# Patient Record
Sex: FEMALE | Race: WHITE | NOT HISPANIC OR LATINO | Employment: UNEMPLOYED | URBAN - METROPOLITAN AREA
[De-identification: names, ages, dates, MRNs, and addresses within clinical notes are randomized per-mention and may not be internally consistent; named-entity substitution may affect disease eponyms.]

---
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
## 2020-06-29 ENCOUNTER — OFFICE VISIT (OUTPATIENT)
Dept: OBGYN CLINIC | Facility: CLINIC | Age: 20
End: 2020-06-29
Payer: COMMERCIAL

## 2020-06-29 VITALS
HEIGHT: 61 IN | WEIGHT: 137.8 LBS | SYSTOLIC BLOOD PRESSURE: 114 MMHG | DIASTOLIC BLOOD PRESSURE: 68 MMHG | BODY MASS INDEX: 26.01 KG/M2

## 2020-06-29 DIAGNOSIS — N94.6 DYSMENORRHEA IN ADOLESCENT: Primary | ICD-10-CM

## 2020-06-29 PROCEDURE — 99202 OFFICE O/P NEW SF 15 MIN: CPT | Performed by: OBSTETRICS & GYNECOLOGY

## 2020-11-05 ENCOUNTER — NURSE TRIAGE (OUTPATIENT)
Dept: OTHER | Facility: OTHER | Age: 20
End: 2020-11-05

## 2020-11-05 DIAGNOSIS — Z20.828 SARS-ASSOCIATED CORONAVIRUS EXPOSURE: Primary | ICD-10-CM

## 2020-11-06 DIAGNOSIS — Z20.828 SARS-ASSOCIATED CORONAVIRUS EXPOSURE: ICD-10-CM

## 2020-11-06 PROCEDURE — U0003 INFECTIOUS AGENT DETECTION BY NUCLEIC ACID (DNA OR RNA); SEVERE ACUTE RESPIRATORY SYNDROME CORONAVIRUS 2 (SARS-COV-2) (CORONAVIRUS DISEASE [COVID-19]), AMPLIFIED PROBE TECHNIQUE, MAKING USE OF HIGH THROUGHPUT TECHNOLOGIES AS DESCRIBED BY CMS-2020-01-R: HCPCS | Performed by: FAMILY MEDICINE

## 2020-11-07 LAB — SARS-COV-2 RNA SPEC QL NAA+PROBE: NOT DETECTED

## 2020-11-28 ENCOUNTER — OFFICE VISIT (OUTPATIENT)
Dept: URGENT CARE | Facility: CLINIC | Age: 20
End: 2020-11-28
Payer: COMMERCIAL

## 2020-11-28 VITALS
RESPIRATION RATE: 16 BRPM | HEART RATE: 80 BPM | WEIGHT: 135 LBS | DIASTOLIC BLOOD PRESSURE: 78 MMHG | TEMPERATURE: 97.8 F | SYSTOLIC BLOOD PRESSURE: 115 MMHG | HEIGHT: 61 IN | OXYGEN SATURATION: 100 % | BODY MASS INDEX: 25.49 KG/M2

## 2020-11-28 DIAGNOSIS — R35.0 URINE FREQUENCY: Primary | ICD-10-CM

## 2020-11-28 LAB
SL AMB  POCT GLUCOSE, UA: NEGATIVE
SL AMB LEUKOCYTE ESTERASE,UA: NORMAL
SL AMB POCT BILIRUBIN,UA: NEGATIVE
SL AMB POCT BLOOD,UA: NEGATIVE
SL AMB POCT CLARITY,UA: CLEAR
SL AMB POCT COLOR,UA: NORMAL
SL AMB POCT KETONES,UA: NEGATIVE
SL AMB POCT NITRITE,UA: NEGATIVE
SL AMB POCT PH,UA: 5
SL AMB POCT SPECIFIC GRAVITY,UA: 1.01
SL AMB POCT URINE HCG: NEGATIVE
SL AMB POCT URINE PROTEIN: NEGATIVE
SL AMB POCT UROBILINOGEN: 0.2

## 2020-11-28 PROCEDURE — 99213 OFFICE O/P EST LOW 20 MIN: CPT | Performed by: NURSE PRACTITIONER

## 2020-11-28 PROCEDURE — 81002 URINALYSIS NONAUTO W/O SCOPE: CPT | Performed by: NURSE PRACTITIONER

## 2020-11-28 PROCEDURE — 81025 URINE PREGNANCY TEST: CPT | Performed by: NURSE PRACTITIONER

## 2020-11-28 PROCEDURE — 87086 URINE CULTURE/COLONY COUNT: CPT | Performed by: NURSE PRACTITIONER

## 2020-11-29 LAB — BACTERIA UR CULT: NORMAL

## 2022-10-31 ENCOUNTER — PREPPED CHART (OUTPATIENT)
Dept: URBAN - METROPOLITAN AREA CLINIC 6 | Facility: CLINIC | Age: 22
End: 2022-10-31

## 2022-11-30 ENCOUNTER — NEW PATIENT (OUTPATIENT)
Dept: URBAN - METROPOLITAN AREA CLINIC 6 | Facility: CLINIC | Age: 22
End: 2022-11-30

## 2022-11-30 DIAGNOSIS — H50.32: ICD-10-CM

## 2022-11-30 DIAGNOSIS — Z01.00: ICD-10-CM

## 2022-11-30 PROCEDURE — 92015 DETERMINE REFRACTIVE STATE: CPT

## 2022-11-30 PROCEDURE — 92004 COMPRE OPH EXAM NEW PT 1/>: CPT

## 2022-11-30 ASSESSMENT — KERATOMETRY
OD_AXISANGLE_DEGREES: 15
OD_K2POWER_DIOPTERS: 45.75
OS_K1POWER_DIOPTERS: 43.75
OS_K2POWER_DIOPTERS: 45.50
OS_AXISANGLE2_DEGREES: 83
OD_K1POWER_DIOPTERS: 42.75
OD_AXISANGLE2_DEGREES: 105
OS_AXISANGLE_DEGREES: 173

## 2022-11-30 ASSESSMENT — VISUAL ACUITY
OD_CC: 20/30-1
OS_CC: 20/30-1

## 2022-11-30 ASSESSMENT — TONOMETRY
OS_IOP_MMHG: 14
OD_IOP_MMHG: 17

## 2024-10-25 ENCOUNTER — ESTABLISHED COMPREHENSIVE EXAM (OUTPATIENT)
Dept: URBAN - METROPOLITAN AREA CLINIC 6 | Facility: CLINIC | Age: 24
End: 2024-10-25

## 2024-10-25 DIAGNOSIS — H50.32: ICD-10-CM

## 2024-10-25 PROCEDURE — 92014 COMPRE OPH EXAM EST PT 1/>: CPT

## 2024-10-25 PROCEDURE — 92015 DETERMINE REFRACTIVE STATE: CPT

## 2024-10-25 ASSESSMENT — TONOMETRY
OS_IOP_MMHG: 14
OD_IOP_MMHG: 12

## 2024-10-25 ASSESSMENT — KERATOMETRY
OD_K1POWER_DIOPTERS: 42.75
OD_AXISANGLE2_DEGREES: 105
OD_K2POWER_DIOPTERS: 45.75
OS_AXISANGLE2_DEGREES: 83
OS_K1POWER_DIOPTERS: 43.75
OS_AXISANGLE_DEGREES: 173
OD_AXISANGLE_DEGREES: 15
OS_K2POWER_DIOPTERS: 45.50

## 2024-10-25 ASSESSMENT — VISUAL ACUITY
OS_CC: 20/30+2
OD_CC: 20/30

## 2025-04-21 ENCOUNTER — OFFICE VISIT (OUTPATIENT)
Dept: GASTROENTEROLOGY | Facility: AMBULARY SURGERY CENTER | Age: 25
End: 2025-04-21
Payer: COMMERCIAL

## 2025-04-21 VITALS
HEART RATE: 79 BPM | OXYGEN SATURATION: 100 % | HEIGHT: 62 IN | DIASTOLIC BLOOD PRESSURE: 72 MMHG | SYSTOLIC BLOOD PRESSURE: 104 MMHG | WEIGHT: 154.2 LBS | BODY MASS INDEX: 28.37 KG/M2

## 2025-04-21 DIAGNOSIS — R19.4 CHANGE IN BOWEL HABITS: Primary | ICD-10-CM

## 2025-04-21 PROCEDURE — 99244 OFF/OP CNSLTJ NEW/EST MOD 40: CPT | Performed by: INTERNAL MEDICINE

## 2025-04-21 RX ORDER — SODIUM CHLORIDE, SODIUM LACTATE, POTASSIUM CHLORIDE, CALCIUM CHLORIDE 600; 310; 30; 20 MG/100ML; MG/100ML; MG/100ML; MG/100ML
125 INJECTION, SOLUTION INTRAVENOUS CONTINUOUS
OUTPATIENT
Start: 2025-04-21

## 2025-04-21 NOTE — PROGRESS NOTES
Name: Aleida Blackwood      : 2000      MRN: [id number]  Encounter Provider: Frannie Ruiz MD  Encounter Date: 2025   Encounter department: [facility]  :  Assessment & Plan  Change in bowel habits  Chronic symptoms > 12 months, initial medical evaluation/management  Discussed multifactorial etiology for chronic idiopathic constipation  Emphasized lifestyle modifications including adequate dietary fiber, hydration, exercise, stress management  Recommend obtain laboratory evaluation as below to assess for celiac disease, hypothyroidism, associated anemia or electrolyte abnormalities  Recommend colonoscopy given change in bowel habits and to r/o alternative etiologies  Recommend to start psyllium fiber daily with transition to miralax if ineffective  Discuss alternative pharmacological treatment options including linzess, patient defers  Orders:    CBC and Platelet; Future    Celiac Disease Comprehensive Panel; Future    Comprehensive metabolic panel; Future    TSH, 3rd generation with Free T4 reflex; Future    Colonoscopy; Future    sodium picosulfate, magnesium oxide, citric acid (Clenpiq) oral solution; Take 175 mL (1 bottle) the evening before the colonoscopy, between 5 PM and 9 PM, followed by a second 175 mL bottle 5 hours before the colonoscopy.        History of Present Illness   Aleida Blackwood is a 24 y.o. female who presents with constipation.  She is present with her mother at today's visit, who contributes to history.    2025 KUB- moderate amount of fecal retention, personally reviewed external report  History of Present Illness  The patient presents for evaluation of constipation. She is accompanied by her mother.    She has been experiencing recurrent constipation, which has necessitated two recent x-ray examinations. A long-standing history of poor digestion is reported, characterized by difficulty in defecation, often resulting in the expulsion of  small, hard stools. This issue has been ongoing for several years and appears to have worsened following a COVID-19 infection. Bowel movements typically occur once daily, but during periods of constipation, significant straining is experienced. A sensation of incomplete evacuation is also reported. There is no presence of blood in the stool. Abdominal discomfort is contingent on food intake and is localized around the umbilical region. Nausea is experienced when constipated, which is alleviated following a bowel movement. Persistent bloating is reported, influenced by diet. These symptoms occur most days of the week and significantly impact the quality of life. Fatigue is also reported, which improves after a bowel movement. No weight loss has been experienced, but a decreased appetite is noted during episodes of constipation.     She has no personal history of abdominal or pelvic surgeries. Tobacco, alcohol, or marijuana use is denied. She resides with her father and has an older sister. Attempts to manage constipation through dietary modifications, including increased fiber intake, and the use of milk of magnesia as needed, have been made. A prebiotic supplement is taken to aid digestion. Stool softeners were previously used but discontinued due to associated nausea. Relief from hot lemon water is found when mildly constipated.    SOCIAL HISTORY  Occupation:  worker  Alcohol: No alcohol use  Tobacco: No tobacco use  Recreational Drugs: No marijuana use      FAMILY HISTORY  - Father: Colon polyps  - Negative for celiac disease, ulcerative colitis, Crohn's disease, esophageal cancer, stomach cancer, and pancreatic cancer    Review of Systems A complete review of systems is negative other than that noted above in the HPI.    Past Medical History   History reviewed. No pertinent past medical history.  History reviewed. No pertinent surgical history.  Family History   Problem Relation Age of Onset    Colon  "cancer Paternal Grandmother       reports that she has never smoked. She has never used smokeless tobacco. She reports that she does not drink alcohol and does not use drugs.  Current Outpatient Medications   Medication Instructions    Aspirin-Acetaminophen-Caffeine (PAMPRIN MAX PO) Take by mouth    IBUPROFEN PO Take by mouth    Magnesium Hydroxide (MILK OF MAGNESIA PO) Take by mouth    norelgestromin-ethinyl estradiol (ORTHO EVRA) 150-35 MCG/24HR 1 patch, Transdermal, Weekly    ondansetron (ZOFRAN ODT) 4 mg, Oral, Every 8 hours PRN    sodium picosulfate, magnesium oxide, citric acid (Clenpiq) oral solution Take 175 mL (1 bottle) the evening before the colonoscopy, between 5 PM and 9 PM, followed by a second 175 mL bottle 5 hours before the colonoscopy.   No Known Allergies      Objective   /72 (BP Location: Right arm, Patient Position: Sitting, Cuff Size: Standard)   Pulse 79   Ht 5' 2\" (1.575 m)   Wt 69.9 kg (154 lb 3.2 oz)   SpO2 100%   BMI 28.20 kg/m²      Physical Exam  Vitals and nursing note reviewed.   Constitutional:       General: She is not in acute distress.     Appearance: Normal appearance.   HENT:      Head: Normocephalic and atraumatic.   Eyes:      Conjunctiva/sclera: Conjunctivae normal.   Cardiovascular:      Rate and Rhythm: Normal rate and regular rhythm.   Pulmonary:      Effort: Pulmonary effort is normal. No respiratory distress.      Breath sounds: Normal breath sounds.   Abdominal:      Palpations: Abdomen is soft.      Tenderness: There is no abdominal tenderness.   Musculoskeletal:         General: Normal range of motion.      Cervical back: Neck supple.   Skin:     General: Skin is warm and dry.   Neurological:      Mental Status: She is alert.   Psychiatric:         Mood and Affect: Mood normal.         Physical Exam  Lungs: Clear to auscultation bilaterally.  Abdomen: Bowel sounds normal. No tenderness on palpation.    Results  Imaging   - X-ray: Stool is backed up  Lab " Results: none relevant

## 2025-04-21 NOTE — PROGRESS NOTES
Scheduled date of colonoscopy (as of today): 6/10/2025  Physician performing colonoscopy: Dr. Ruiz  Location of colonoscopy: [facility]  Bowel prep reviewed with patient: Clenpiq  Instructions reviewed with patient by: Glenna FISHER  Clearances: N/A

## 2025-04-21 NOTE — PATIENT INSTRUCTIONS
Constipation   WHAT YOU NEED TO KNOW:   Constipation means you have hard, dry bowel movements, or you go longer than usual between bowel movements.      Medicines:   Medicine  such as a laxative may help relax and loosen your intestines to help you have a bowel movement. These medications are safe and help your bowels to move regularly to improve your symptoms.    Take your medicine as directed.  Contact your healthcare provider if you think your medicine is not helping or if you have side effects. Tell him of her if you are allergic to any medicine. Keep a list of the medicines, vitamins, and herbs you take. Include the amounts, and when and why you take them. Bring the list or the pill bottles to follow-up visits. Carry your medicine list with you in case of an emergency.      Prevent constipation:   Drink liquids as directed.  You may need to drink extra liquids to help soften and move your bowels. Ask how much liquid to drink each day and which liquids are best for you.    Eat high-fiber foods.  This may help decrease constipation by adding bulk to your bowel movements. High-fiber foods include fruit, vegetables, whole-grain breads and cereals, and beans. Your healthcare provider or dietitian can help you create a high-fiber meal plan. Your provider may also recommend a fiber supplement if you cannot get enough fiber from food.         Exercise regularly.  Regular physical activity can help stimulate your intestines. Walking is a good exercise to prevent or relieve constipation. Ask which exercises are best for you.         Talk to your healthcare provider about your medicines.  Certain medicines, such as opioids, can cause constipation. Your provider may be able to make medicine changes. For example, he or she may change the kind of medicine, or change when you take it.    Follow up with your doctor as directed:  Write down your questions so you remember to ask them during your visits.   © Copyright [employer] 2021 Information is for End User's use only and may not be sold, redistributed or otherwise used for commercial purposes. All illustrations and images included in CareNotes® are the copyrighted property of [employer] or [employer]  The above information is an  only. It is not intended as medical advice for individual conditions or treatments. Talk to your doctor, nurse or pharmacist before following any medical regimen to see if it is safe and effective for you.      What is constipation?  Constipation happens when fecal material (stool) moves through the large bowel (colon) too slowly. The fluid portion of the stool is absorbed back into the body, so the stool becomes hard and dry. This makes it difficult to pass the stool.    What causes constipation?  Poor nutrition, inadequate sleep, limited exercise, anxiety, emotional stress and age may cause constipation. Certain disease also can cause constipation, and are usually associated with a sudden change in bowel habits, pain, weight loss, fatigue or bloody stools. Contact your doctor if you experience these symptoms. Some medications cause constipation - talk to your doctor if you think your medications are causing constipation.    Can eating more fiber help with constipation?  Yes. Fiber is the part of plant food that is not digested. There are two kinds of fiber: soluble and insoluble. Soluble fiber gives stool bulk. Foods that are good sources of soluble fiber include apples, bananas, barley, oats, and beans. Insoluble fiber helps speed up the transit of food in the digestive tract and helps prevent constipation. Good sources of insoluble fiber include whole grains, most vegetables, wheat bran, and legumes. Foods that have fiber contain both soluble and insoluble fibers. A good goal for dietary fiber is a total of about 20 to 30 grams each day.    GUIDELINES TO TREAT CONSTIPATION    Nutrition  Eat three meals each day. Do not  "skip meals.  Gradually increase the amount of high-fiber foods in your diet.  Choose more whole grain breads, cereals and rice.  Select more raw fruits and vegetables -- eat the peel, if appropriate.  Read food labels and look for the \"dietary fiber\" content of foods. Good sources have 2 grams of fiber or more.  Drink six to eight glasses of water each day.  Limit highly refined and processed foods.    Exercise and Sleep  Exercise regularly. Try to do weight-bearing exercise, such as walking, three or more times each week.  Go to sleep at a regular time each night. Make sure you get enough sleep.    Stress and Anxiety  Try to limit stress in your life.  Go for a short walk when you feel anxiety or stress increasing.    [facility] medical specialists have reviewed this information. It is for educational purposes only and is not intended to replace the advice of your doctor or other health care provider. We encourage you to discuss any questions or concerns you may have with your provider.    MEDICATIONS I RECOMMEND OVER THE COUNTER:    For your bowel habits, as we discussed during today's visit,  - I would recommend starting psyllium, fiber supplementation.  This can be done with use of Metamucil or Benefiber fiber, which can be purchased over-the-counter.  I would recommend starting slow with 1 tsp mixed into a large glass of water, once a day, and increasing to goal of 1 tbsp mixed into a large glass of water per day.  - this helps with both diarrhea and constipation, helping to bulk the stool, and should not cause constipation or diarrhea, but treat both  - the only side effect of this can be bloating, if this occurs, cut down on the dose, and increase your water intake    DO THIS FOR 2 WEEKS, IF NO BENEFIT,    You have been recommended miralax (polyethylene glycol) for treatment of your CONSTIPATION.    Start 1 capful daily. Take this dose x 3 days. If your symptoms are improved, great! Continue this dose " daily.    If you have diarrhea (stools are loose, associated with accidents, or urgency) with this dose, cut down to 1/2 capful daily. Continue to titrate down until you find the dose for you. This might be 1 tbsp daily. Wait 3 days before making a change.    If you have continued constipation with 1 capful daily, you may need a higher dose. Start with 1.5 capfuls daily and titrate upward. Eg might need 1 capful twice daily. There is no maximum dose, whatever works for you. Again, wait 3 days before making a change.    - gas-x (simethicone) or BEANO over the counter for symptoms of bloating/flatulence/gas/belching

## 2025-04-24 ENCOUNTER — APPOINTMENT (OUTPATIENT)
Dept: LAB | Facility: CLINIC | Age: 25
End: 2025-04-24
Payer: COMMERCIAL

## 2025-04-24 DIAGNOSIS — R19.4 CHANGE IN BOWEL HABITS: ICD-10-CM

## 2025-04-24 LAB
ALBUMIN SERPL BCG-MCNC: 4.4 G/DL (ref 3.5–5)
ALP SERPL-CCNC: 55 U/L (ref 34–104)
ALT SERPL W P-5'-P-CCNC: 12 U/L (ref 7–52)
ANION GAP SERPL CALCULATED.3IONS-SCNC: 7 MMOL/L (ref 4–13)
AST SERPL W P-5'-P-CCNC: 16 U/L (ref 13–39)
BILIRUB SERPL-MCNC: 0.45 MG/DL (ref 0.2–1)
BUN SERPL-MCNC: 8 MG/DL (ref 5–25)
CALCIUM SERPL-MCNC: 9.4 MG/DL (ref 8.4–10.2)
CHLORIDE SERPL-SCNC: 103 MMOL/L (ref 96–108)
CO2 SERPL-SCNC: 29 MMOL/L (ref 21–32)
CREAT SERPL-MCNC: 0.81 MG/DL (ref 0.6–1.3)
ERYTHROCYTE [DISTWIDTH] IN BLOOD BY AUTOMATED COUNT: 12.5 % (ref 11.6–15.1)
GFR SERPL CREATININE-BSD FRML MDRD: 101 ML/MIN/1.73SQ M
GLUCOSE P FAST SERPL-MCNC: 87 MG/DL (ref 65–99)
HCT VFR BLD AUTO: 42 % (ref 34.8–46.1)
HGB BLD-MCNC: 14.2 G/DL (ref 11.5–15.4)
IGA SERPL-MCNC: 151 MG/DL (ref 66–433)
MCH RBC QN AUTO: 30.4 PG (ref 26.8–34.3)
MCHC RBC AUTO-ENTMCNC: 33.8 G/DL (ref 31.4–37.4)
MCV RBC AUTO: 90 FL (ref 82–98)
PLATELET # BLD AUTO: 237 THOUSANDS/UL (ref 149–390)
PMV BLD AUTO: 10.3 FL (ref 8.9–12.7)
POTASSIUM SERPL-SCNC: 3.9 MMOL/L (ref 3.5–5.3)
PROT SERPL-MCNC: 7.4 G/DL (ref 6.4–8.4)
RBC # BLD AUTO: 4.67 MILLION/UL (ref 3.81–5.12)
SODIUM SERPL-SCNC: 139 MMOL/L (ref 135–147)
TSH SERPL DL<=0.05 MIU/L-ACNC: 1.81 UIU/ML (ref 0.45–4.5)
WBC # BLD AUTO: 5.62 THOUSAND/UL (ref 4.31–10.16)

## 2025-04-24 PROCEDURE — 86364 TISS TRNSGLTMNASE EA IG CLAS: CPT

## 2025-04-24 PROCEDURE — 36415 COLL VENOUS BLD VENIPUNCTURE: CPT

## 2025-04-24 PROCEDURE — 82784 ASSAY IGA/IGD/IGG/IGM EACH: CPT

## 2025-04-24 PROCEDURE — 85027 COMPLETE CBC AUTOMATED: CPT

## 2025-04-24 PROCEDURE — 84443 ASSAY THYROID STIM HORMONE: CPT

## 2025-04-24 PROCEDURE — 80053 COMPREHEN METABOLIC PANEL: CPT

## 2025-04-25 LAB — TTG IGA SER IA-ACNC: 0.4 U/ML (ref ?–10)

## 2025-04-28 ENCOUNTER — RESULTS FOLLOW-UP (OUTPATIENT)
Dept: GASTROENTEROLOGY | Facility: AMBULARY SURGERY CENTER | Age: 25
End: 2025-04-28

## 2025-05-27 ENCOUNTER — ANESTHESIA EVENT (OUTPATIENT)
Dept: ANESTHESIOLOGY | Facility: HOSPITAL | Age: 25
End: 2025-05-27

## 2025-05-27 ENCOUNTER — ANESTHESIA (OUTPATIENT)
Dept: ANESTHESIOLOGY | Facility: HOSPITAL | Age: 25
End: 2025-05-27

## 2025-06-03 ENCOUNTER — TELEPHONE (OUTPATIENT)
Dept: GASTROENTEROLOGY | Facility: AMBULARY SURGERY CENTER | Age: 25
End: 2025-06-03

## 2025-06-09 ENCOUNTER — TELEPHONE (OUTPATIENT)
Dept: GASTROENTEROLOGY | Facility: AMBULARY SURGERY CENTER | Age: 25
End: 2025-06-09

## 2025-06-10 ENCOUNTER — HOSPITAL ENCOUNTER (OUTPATIENT)
Dept: GASTROENTEROLOGY | Facility: AMBULARY SURGERY CENTER | Age: 25
Setting detail: OUTPATIENT SURGERY
Discharge: HOME | End: 2025-06-10
Attending: INTERNAL MEDICINE
Payer: COMMERCIAL

## 2025-06-10 ENCOUNTER — ANESTHESIA (OUTPATIENT)
Dept: GASTROENTEROLOGY | Facility: AMBULARY SURGERY CENTER | Age: 25
End: 2025-06-10
Payer: COMMERCIAL

## 2025-06-10 VITALS
DIASTOLIC BLOOD PRESSURE: 55 MMHG | TEMPERATURE: 97.7 F | SYSTOLIC BLOOD PRESSURE: 94 MMHG | RESPIRATION RATE: 18 BRPM | OXYGEN SATURATION: 100 % | HEART RATE: 84 BPM | HEIGHT: 62 IN | WEIGHT: 148 LBS | BODY MASS INDEX: 27.23 KG/M2

## 2025-06-10 DIAGNOSIS — R19.4 CHANGE IN BOWEL HABITS: ICD-10-CM

## 2025-06-10 LAB
EXT PREGNANCY TEST URINE: NEGATIVE
EXT. CONTROL: NORMAL

## 2025-06-10 PROCEDURE — 81025 URINE PREGNANCY TEST: CPT | Performed by: INTERNAL MEDICINE

## 2025-06-10 RX ORDER — PROPOFOL 10 MG/ML
INJECTION, EMULSION INTRAVENOUS AS NEEDED
Status: DISCONTINUED | OUTPATIENT
Start: 2025-06-10 | End: 2025-06-10

## 2025-06-10 RX ORDER — SODIUM CHLORIDE, SODIUM LACTATE, POTASSIUM CHLORIDE, CALCIUM CHLORIDE 600; 310; 30; 20 MG/100ML; MG/100ML; MG/100ML; MG/100ML
125 INJECTION, SOLUTION INTRAVENOUS CONTINUOUS
Status: DISCONTINUED | OUTPATIENT
Start: 2025-06-10 | End: 2025-06-14 | Stop reason: HOSPADM

## 2025-06-10 RX ORDER — SODIUM CHLORIDE, SODIUM LACTATE, POTASSIUM CHLORIDE, CALCIUM CHLORIDE 600; 310; 30; 20 MG/100ML; MG/100ML; MG/100ML; MG/100ML
INJECTION, SOLUTION INTRAVENOUS CONTINUOUS PRN
Status: DISCONTINUED | OUTPATIENT
Start: 2025-06-10 | End: 2025-06-10

## 2025-06-10 RX ADMIN — PROPOFOL 50 MG: 10 INJECTION, EMULSION INTRAVENOUS at 12:25

## 2025-06-10 RX ADMIN — PROPOFOL 50 MG: 10 INJECTION, EMULSION INTRAVENOUS at 12:29

## 2025-06-10 RX ADMIN — SODIUM CHLORIDE, SODIUM LACTATE, POTASSIUM CHLORIDE, AND CALCIUM CHLORIDE: .6; .31; .03; .02 INJECTION, SOLUTION INTRAVENOUS at 12:18

## 2025-06-10 RX ADMIN — PROPOFOL 50 MG: 10 INJECTION, EMULSION INTRAVENOUS at 12:33

## 2025-06-10 RX ADMIN — PROPOFOL 150 MG: 10 INJECTION, EMULSION INTRAVENOUS at 12:22

## 2025-06-10 NOTE — ANESTHESIA PREPROCEDURE EVALUATION
Procedure:  COLONOSCOPY    Relevant Problems   No relevant active problems      NPO 6am prep 6/10    Physical Exam    Airway     Mallampati score: I  TM Distance: >3 FB  Neck ROM: full      Cardiovascular  Cardiovascular exam normal    Dental       Pulmonary  Pulmonary exam normal     Neurological      Other Findings  post-pubertal.      Anesthesia Plan  ASA Score- 1     Anesthesia Type- IV sedation with anesthesia with ASA Monitors.         Additional Monitors:     Airway Plan:            Plan Factors-Exercise tolerance (METS): >4 METS.    Chart reviewed. EKG reviewed.  Existing labs reviewed. Patient summary reviewed.                  Induction- intravenous.    Postoperative Plan- .   Monitoring Plan - Monitoring plan - standard ASA monitoring      Perioperative Resuscitation Plan - Level 1 - Full Code.       Informed Consent- Anesthetic plan and risks discussed with patient, father and mother.  I personally reviewed this patient with the CRNA. Discussed and agreed on the Anesthesia Plan with the CRNA..      NPO Status:  Vitals Value Taken Time   Date of last liquid 06/10/25 06/10/25 11:25   Time of last liquid 0600 06/10/25 11:25   Date of last solid 06/09/25 06/10/25 11:25   Time of last solid 0001 06/10/25 11:25

## 2025-06-10 NOTE — ANESTHESIA POSTPROCEDURE EVALUATION
Post-Op Assessment Note    CV Status:  Stable    Pain management: adequate       Mental Status:  Alert and awake   Hydration Status:  Euvolemic   PONV Controlled:  Controlled   Airway Patency:  Patent     Post Op Vitals Reviewed: Yes    No anethesia notable event occurred.    Staff: Anesthesiologist, CRNA           Last Filed PACU Vitals:  Vitals Value Taken Time   Temp 98    Pulse 67    /67    Resp 13    SpO2 100

## 2025-06-10 NOTE — H&P
"History and Physical -  [facility]  Aleida Blackwood 24 y.o. female MRN: [id number]                  HPI: Aleida Blackwood is a 24 y.o. year old female who presents for change in bowel habits.      REVIEW OF SYSTEMS: Per the HPI, and otherwise unremarkable.    Historical Information   Past Medical History[1]  Past Surgical History[2]  Social History   Social History     Substance and Sexual Activity   Alcohol Use No     Social History     Substance and Sexual Activity   Drug Use No     Tobacco Use History[3]  Family History[4]    Meds/Allergies     Current Medications[5]    Allergies[6]    Objective     BP 98/59   Pulse 68   Temp (!) 96.8 °F (36 °C) (Temporal)   Resp 18   Ht 5' 2\" (1.575 m)   Wt 67.1 kg (148 lb)   LMP  (LMP Unknown)   SpO2 100%   BMI 27.07 kg/m²       PHYSICAL EXAM    Gen: NAD  Head: NCAT  CV: RRR  CHEST: Clear  ABD: soft, NT/ND  EXT: no edema      ASSESSMENT/PLAN:  This is a 24 y.o. year old female here for colonoscopy, and she is stable and optimized for her procedure.             [1]   Past Medical History:  Diagnosis Date    Constipation    [2]   Past Surgical History:  Procedure Laterality Date    WISDOM TOOTH EXTRACTION     [3]   Social History  Tobacco Use   Smoking Status Never   Smokeless Tobacco Never   [4]   Family History  Problem Relation Name Age of Onset    Colon cancer Paternal Grandmother     [5]   Current Outpatient Medications:     Magnesium Hydroxide (MILK OF MAGNESIA PO)    Aspirin-Acetaminophen-Caffeine (PAMPRIN MAX PO)    IBUPROFEN PO    Current Facility-Administered Medications:     lactated ringers infusion, 125 mL/hr, Intravenous, Continuous  [6] No Known Allergies    "

## 2025-07-23 ENCOUNTER — NURSE TRIAGE (OUTPATIENT)
Age: 25
End: 2025-07-23

## 2025-07-23 NOTE — TELEPHONE ENCOUNTER
"REASON FOR CONVERSATION: Constipation and Bloated    SYMPTOMS: constipation off and on, moderate bloating when constipated, abdominal cramping at time when having BM    OTHER HEALTH INFORMATION: Pt noticed she was constipated a couple days ago, took dose of MOM last HS with no improvement. Gave herself a Fleet enema this morning and did have BM. BMs are hard. Pt states she's been eating a lot of fiber and tries to drink a lot of water. She was asking about stool testing. Denies any blood on stool.     PROTOCOL DISPOSITION: 72 Hour Provider Response and Next Available Appointment (overriding See Within 3 Days in Office)    CARE ADVICE PROVIDED: Decrease fiber a bit, increase fluid intake; start Miralax daily, increase to BID if needed; add stool softener if needed; call back if sx don't improve or worsen    Urgent OV: 8/13/25    PRACTICE FOLLOW-UP: please review    Reason for Disposition   Unable to have a bowel movement (BM) without using a laxative, suppository, or enema    Answer Assessment - Initial Assessment Questions  1. STOOL PATTERN OR FREQUENCY: \"How often do you have a bowel movement (BM)?\"  (Normal range: 3 times a day to every 3 days)  \"When was your last BM?\"        Was daily  2. STRAINING: \"Do you have to strain to have a BM?\"       yes  3. ONSET: \"When did the constipation begin?\"      Last couple days  4. RECTAL PAIN: \"Does your rectum hurt when the stool comes out?\" If Yes, ask: \"Do you have hemorrhoids? How bad is the pain?\"  (Scale 1-10; or mild, moderate, severe)      denies  5. BM COMPOSITION: \"Are the stools hard?\"       yes  6. BLOOD ON STOOLS: \"Has there been any blood on the toilet tissue or on the surface of the BM?\" If Yes, ask: \"When was the last time?\"      denies  7. CHRONIC CONSTIPATION: \"Is this a new problem for you?\"  If No, ask: \"How long have you had this problem?\" (days, weeks, months)       yes  8. CHANGES IN DIET OR HYDRATION: \"Have there been any recent changes in your diet?\" " "\"How much fluids are you drinking on a daily basis?\"  \"How much have you had to drink today?\"      Eating a lot of fiber, increase water  9. MEDICINES: \"Have you been taking any new medicines?\" \"Are you taking any narcotic pain medicines?\" (e.g., Dilaudid, morphine, Percocet, Vicodin)      denies  10. LAXATIVES: \"Have you been using any stool softeners, laxatives, or enemas?\"  If Yes, ask \"What, how often, and when was the last time?\"        MOM last HS, Fleet enema  11. ACTIVITY:  \"How much walking do you do every day?\"  \"Has your activity level decreased in the past week?\"         yes  12. CAUSE: \"What do you think is causing the constipation?\"         unsure  13. MEDICAL HISTORY: \"Do you have a history of hemorrhoids, rectal fissures, rectal surgery, or rectal abscess?\"          Small hemorrhoids during colonoscopy  14. OTHER SYMPTOMS: \"Do you have any other symptoms?\" (e.g., abdomen pain, bloating, fever, vomiting)        Bloating, abdominal cramping at times, nausea  15. PREGNANCY: \"Is there any chance you are pregnant?\" \"When was your last menstrual period?\"        denies    Protocols used: Constipation-Adult-[place]    "

## 2025-07-23 NOTE — TELEPHONE ENCOUNTER
Regarding: Constipation/bloating  ----- Message from Gely MAE sent at 7/23/2025  9:44 AM EDT -----  Patient mother called in to [facility] OV for patient she is having terrible bloating and constipation she is [facility] for an OV 10/1 Dr Ruiz

## 2025-08-01 ENCOUNTER — ANNUAL EXAM (OUTPATIENT)
Dept: OBGYN CLINIC | Facility: CLINIC | Age: 25
End: 2025-08-01
Payer: COMMERCIAL

## 2025-08-01 VITALS
SYSTOLIC BLOOD PRESSURE: 112 MMHG | BODY MASS INDEX: 28.05 KG/M2 | HEIGHT: 62 IN | WEIGHT: 152.4 LBS | DIASTOLIC BLOOD PRESSURE: 76 MMHG

## 2025-08-01 DIAGNOSIS — N94.6 DYSMENORRHEA: ICD-10-CM

## 2025-08-01 DIAGNOSIS — Z01.419 ENCOUNTER FOR WELL WOMAN EXAM: Primary | ICD-10-CM

## 2025-08-01 DIAGNOSIS — Z12.4 CERVICAL CANCER SCREENING: ICD-10-CM

## 2025-08-01 DIAGNOSIS — Z01.419 ENCOUNTER FOR ROUTINE GYNECOLOGICAL EXAMINATION WITH PAPANICOLAOU SMEAR OF CERVIX: ICD-10-CM

## 2025-08-01 DIAGNOSIS — Z12.39 ENCOUNTER FOR SCREENING BREAST EXAMINATION: ICD-10-CM

## 2025-08-01 PROCEDURE — S0610 ANNUAL GYNECOLOGICAL EXAMINA: HCPCS | Performed by: PHYSICIAN ASSISTANT

## 2025-08-01 PROCEDURE — G0145 SCR C/V CYTO,THINLAYER,RESCR: HCPCS | Performed by: PHYSICIAN ASSISTANT

## 2025-08-07 ENCOUNTER — TELEPHONE (OUTPATIENT)
Dept: GASTROENTEROLOGY | Facility: AMBULARY SURGERY CENTER | Age: 25
End: 2025-08-07

## 2025-08-07 ENCOUNTER — TELEPHONE (OUTPATIENT)
Age: 25
End: 2025-08-07

## 2025-08-08 LAB
LAB AP GYN PRIMARY INTERPRETATION: NORMAL
Lab: NORMAL
PATH INTERP SPEC-IMP: NORMAL

## 2025-08-12 ENCOUNTER — OFFICE VISIT (OUTPATIENT)
Dept: GASTROENTEROLOGY | Facility: AMBULARY SURGERY CENTER | Age: 25
End: 2025-08-12
Payer: COMMERCIAL